# Patient Record
Sex: MALE | Race: WHITE
[De-identification: names, ages, dates, MRNs, and addresses within clinical notes are randomized per-mention and may not be internally consistent; named-entity substitution may affect disease eponyms.]

---

## 2018-08-18 ENCOUNTER — HOSPITAL ENCOUNTER (EMERGENCY)
Dept: HOSPITAL 43 - DL.ED | Age: 63
Discharge: HOME | End: 2018-08-18
Payer: COMMERCIAL

## 2018-08-18 DIAGNOSIS — R04.0: Primary | ICD-10-CM

## 2018-08-18 DIAGNOSIS — Z79.899: ICD-10-CM

## 2018-08-18 PROCEDURE — 70450 CT HEAD/BRAIN W/O DYE: CPT

## 2018-08-18 PROCEDURE — 99283 EMERGENCY DEPT VISIT LOW MDM: CPT

## 2018-08-18 NOTE — EDM.PDOC
ED HPI GENERAL MEDICAL PROBLEM





- General


Time Seen by Provider: 08/18/18 08:16


Source of Information: Reports: Patient, RN, RN Notes Reviewed


History Limitations: Reports: No Limitations





- History of Present Illness


INITIAL COMMENTS - FREE TEXT/NARRATIVE: 


Pt to ER with c/o nosebleed that began yesterday, has stopped and started again 

several times. Patient states he began having a headache on the lateral right 

side of the head around July 22. He states he was put on antibiotics for a 

sinus infection. He states he has recently had a sleep study. Patient admits to 

taking a baby Aspirin daily, which he did not take today. Patient admits to 

some sinus drainage, headache which has been dull but constant since July, and 

slight cough. Patient states a fever around July 22, but not thereafter. Denies 

chills, N/V/D. 


Onset: Gradual


Duration: Constant


Location: Reports: Head, Face


Quality: Reports: Pressure


Severity: Moderate


Improves with: Reports: None


Worsens with: Reports: None


Associated Symptoms: Reports: Other (epistaxis)





- Related Data


 Allergies











Allergy/AdvReac Type Severity Reaction Status Date / Time


 


No Known Allergies Allergy   Verified 08/18/18 08:18











Home Meds: 


 Home Meds





Aspirin 81 mg PO DAILY 08/18/18 [History]


Hydrochlorothiazide [Microzide] 12.5 mg PO DAILY 08/18/18 [History]


Pravastatin Sodium 20 mg PO DAILY 08/18/18 [History]


busPIRone [Buspar] 20 mg PO DAILY 08/18/18 [History]











ED ROS ENT





- Review of Systems


Review Of Systems: ROS reveals no pertinent complaints other than HPI.





ED EXAM, ENT





- Physical Exam


Exam: See Below


Exam Limited By: No Limitations


General Appearance: Alert, WD/WN, Mild Distress


Eye Exam: Bilateral Eye: EOMI, Normal Inspection


Ears: Normal External Exam, Hearing Grossly Normal


Nose: Nasal Deformity (Pt states broke nose about 45-50 years ago), Dried Blood

, Injected Turbinates.  No: Active Bleeding


Mouth/Throat: Normal Inspection, Normal Gums, Normal Lips, Normal Oropharynx, 

Normal Teeth


Head: Atraumatic, Normocephalic


Neck: Supple, Non-Tender, Full Range of Motion, Lymphadenopathy (L), 

Lymphadenopathy (R)


Respiratory/Chest: No Respiratory Distress, Lungs Clear, Normal Breath Sounds, 

No Accessory Muscle Use, Chest Non-Tender


Cardiovascular: Normal Peripheral Pulses, Regular Rate, Rhythm, No Edema, No 

Gallop, No JVD, No Murmur, No Rub


GI/Abdominal: Normal Bowel Sounds, Soft, Non-Tender


 (Male) Exam: Deferred


Rectal (Males) Exam: Deferred


Back: Normal Inspection, Full Range of Motion


Extremities: Normal Inspection, Normal Range of Motion, Non-Tender, No Pedal 

Edema, Normal Capillary Refill


Neurological: Alert, Oriented, CN II-XII Intact, Normal Cognition, Normal Gait, 

Normal Reflexes, No Motor/Sensory Deficits


Psychiatric: Normal Affect, Normal Mood


Skin: Warm, Dry, Intact, Normal Color, No Rash


Lymphatic: No Adenopathy





Course





- Vital Signs


Last Recorded V/S: 


 Last Vital Signs











Temp  97.7 F   08/18/18 08:12


 


Pulse  84   08/18/18 08:12


 


Resp  16   08/18/18 08:12


 


BP  148/98 H  08/18/18 08:12


 


Pulse Ox  96   08/18/18 08:12














- Orders/Labs/Meds


Orders: 


 Active Orders 24 hr











 Category Date Time Status


 


 Head wo Cont [CT] Urgent Exams  08/18/18 08:33 Taken














- Radiology Interpretation


Free Text/Narrative:: 


Head CT:


FINDINGS:


Brain: No acute brain parenchymal abnormality. No intracranial hemorrhage. No 

intracranial mass


or edema.


Ventricles: No hydrocephalus.


Bones/joints: No calvarial fracture.


Soft tissues: Unremarkable.


Sinuses: There is mucoperiosteal thickening, but no fluid in the visualized 

paranasal sinuses.


Mastoid air cells: The visualized mastoid air cells are aerated.


IMPRESSION:


No acute intracranial abnormality.





See Rad report








Departure





- Departure


Time of Disposition: 10:21


Disposition: Home, Self-Care 01


Condition: Fair


Clinical Impression: 


 Epistaxis








- Discharge Information


*PRESCRIPTION DRUG MONITORING PROGRAM REVIEWED*: No


*COPY OF PRESCRIPTION DRUG MONITORING REPORT IN PATIENT YUSEF: No


Instructions:  Nosebleed, Easy-to-Read


Forms:  ED Department Discharge


Additional Instructions: 


Follow up with your primary care facility early next week to discuss a referral 

to ENT.


Return to the ER if any further problems with bleeding from the nose. 


If the nose begins to bleed again, hold pressure for 10 minutes without 

stopping. 


Hold Aspirin until seen by your primary care facility








- My Orders


Last 24 Hours: 


My Active Orders





08/18/18 08:33


Head wo Cont [CT] Urgent 














- Assessment/Plan


Last 24 Hours: 


My Active Orders





08/18/18 08:33


Head wo Cont [CT] Urgent

## 2019-04-12 ENCOUNTER — HOSPITAL ENCOUNTER (OUTPATIENT)
Dept: HOSPITAL 38 - CC.SDS | Age: 64
End: 2019-04-12
Attending: FAMILY MEDICINE
Payer: COMMERCIAL

## 2019-04-12 DIAGNOSIS — Z12.11: Primary | ICD-10-CM

## 2019-04-12 DIAGNOSIS — Z86.010: ICD-10-CM

## 2019-04-12 DIAGNOSIS — Z87.891: ICD-10-CM

## 2019-04-12 DIAGNOSIS — D12.5: ICD-10-CM

## 2019-04-12 DIAGNOSIS — E78.2: ICD-10-CM

## 2019-04-12 DIAGNOSIS — I10: ICD-10-CM

## 2019-04-12 DIAGNOSIS — K57.30: ICD-10-CM

## 2019-04-12 DIAGNOSIS — D12.3: ICD-10-CM

## 2019-04-15 NOTE — OR
DATE OF OPERATION:  04/12/2019

 

PREOPERATIVE DIAGNOSIS:  SCREENING COLONOSCOPY.

 

POSTOPERATIVE DIAGNOSIS:

1. SIGMOID DIVERTICULOSIS, MILD.

2. TUBULAR ADENOMA X2.

 

SURGEON:  Rashi Martini MD

 

PROCEDURE:  FULL-LENGTH COLONOSCOPY WITH SNARE POLYPECTOMY X2.

 

ANESTHESIA:  MAC via CRNA.

 

COMPLICATIONS:  None.

 

SPECIMEN:  Tubular adenomas x2, each less than 0.5 cm.

 

RECOMMENDATIONS:  Followup colonoscopy in 5 years.

 

INDICATIONS:  The patient was seen by his primary provider in [place], [place], for a routine physical.  A screening colonoscopy was ordered.

 

DESCRIPTION OF PROCEDURE:  The patient was prepped and draped, placed in the

left lateral decubitus position.  A lubricated Olympus colonoscope was inserted

and easily advanced to the cecum.  Direct visualization of the ileocecal valve

and appendiceal orifice was accomplished.  The bowel prep was adequate.  Upon

withdrawal of the scope, the cecum and ascending colon were essentially benign.

In the distal transverse colon, the patient had a small stalked tubular adenoma,

easily removed with a snare and suctioned into polyp trap #1.  Descending colon

was unremarkable.  The patient had a few scattered diverticula in the sigmoid

colon, very minimal in severity.  No inflammatory changes.  There was a second

polyp in the distal sigmoid, removed with a snare and suctioned into polyp trap

#2.  I could find no other worrisome bleeding sites, signs of colitis, or

vascular abnormalities.  The rectal vault was unremarkable.  Retroflexion of the

scope in the rectum

showed no perianal lesions.  Air was suctioned, scope removed without

complication.

 

ILEANA/HEIDY

DD:  04/12/2019 10:45:51

DT:  04/12/2019 12:33:45

Job #:  [id number]

 

cc:   JASON Jarquin

      [place], [place]